# Patient Record
Sex: MALE | Race: NATIVE HAWAIIAN OR OTHER PACIFIC ISLANDER | NOT HISPANIC OR LATINO | Employment: FULL TIME | URBAN - METROPOLITAN AREA
[De-identification: names, ages, dates, MRNs, and addresses within clinical notes are randomized per-mention and may not be internally consistent; named-entity substitution may affect disease eponyms.]

---

## 2021-08-06 ENCOUNTER — OFFICE VISIT (OUTPATIENT)
Dept: URGENT CARE | Facility: CLINIC | Age: 21
End: 2021-08-06
Payer: COMMERCIAL

## 2021-08-06 ENCOUNTER — HOSPITAL ENCOUNTER (OUTPATIENT)
Facility: MEDICAL CENTER | Age: 21
End: 2021-08-06
Attending: NURSE PRACTITIONER
Payer: COMMERCIAL

## 2021-08-06 VITALS
DIASTOLIC BLOOD PRESSURE: 70 MMHG | HEIGHT: 71 IN | TEMPERATURE: 98.7 F | BODY MASS INDEX: 33.4 KG/M2 | OXYGEN SATURATION: 98 % | WEIGHT: 238.54 LBS | RESPIRATION RATE: 16 BRPM | SYSTOLIC BLOOD PRESSURE: 132 MMHG | HEART RATE: 88 BPM

## 2021-08-06 DIAGNOSIS — R05.9 COUGH: ICD-10-CM

## 2021-08-06 DIAGNOSIS — H66.93 ACUTE BACTERIAL OTITIS MEDIA, BILATERAL: ICD-10-CM

## 2021-08-06 DIAGNOSIS — J02.9 PHARYNGITIS, UNSPECIFIED ETIOLOGY: ICD-10-CM

## 2021-08-06 DIAGNOSIS — R42 DIZZINESS: ICD-10-CM

## 2021-08-06 DIAGNOSIS — M79.10 MYALGIA: ICD-10-CM

## 2021-08-06 DIAGNOSIS — U07.1 COVID-19: ICD-10-CM

## 2021-08-06 LAB
INT CON NEG: NORMAL
INT CON POS: NORMAL
S PYO AG THROAT QL: NEGATIVE

## 2021-08-06 PROCEDURE — U0003 INFECTIOUS AGENT DETECTION BY NUCLEIC ACID (DNA OR RNA); SEVERE ACUTE RESPIRATORY SYNDROME CORONAVIRUS 2 (SARS-COV-2) (CORONAVIRUS DISEASE [COVID-19]), AMPLIFIED PROBE TECHNIQUE, MAKING USE OF HIGH THROUGHPUT TECHNOLOGIES AS DESCRIBED BY CMS-2020-01-R: HCPCS

## 2021-08-06 PROCEDURE — 99203 OFFICE O/P NEW LOW 30 MIN: CPT | Performed by: NURSE PRACTITIONER

## 2021-08-06 PROCEDURE — 87880 STREP A ASSAY W/OPTIC: CPT | Performed by: NURSE PRACTITIONER

## 2021-08-06 PROCEDURE — U0005 INFEC AGEN DETEC AMPLI PROBE: HCPCS

## 2021-08-06 RX ORDER — AMOXICILLIN AND CLAVULANATE POTASSIUM 875; 125 MG/1; MG/1
1 TABLET, FILM COATED ORAL 2 TIMES DAILY
Qty: 14 TABLET | Refills: 0 | Status: SHIPPED
Start: 2021-08-06 | End: 2021-08-13

## 2021-08-06 ASSESSMENT — ENCOUNTER SYMPTOMS
HEADACHES: 0
ORTHOPNEA: 0
SINUS PAIN: 0
SPUTUM PRODUCTION: 0
FOCAL WEAKNESS: 0
SPEECH CHANGE: 0
SENSORY CHANGE: 0
DIAPHORESIS: 0
HEMOPTYSIS: 0
WHEEZING: 0
DIZZINESS: 1
SHORTNESS OF BREATH: 0
FEVER: 0
COUGH: 1
MYALGIAS: 1
CHILLS: 0
SORE THROAT: 1
NECK PAIN: 0
PALPITATIONS: 0
TINGLING: 0

## 2021-08-06 NOTE — LETTER
August 6, 2021         Patient: Holomeesi Lilo   YOB: 2000   Date of Visit: 8/6/2021           To Whom it May Concern:    Holomeesi Lilo was seen in my clinic on 8/6/2021. A concern for Covid-19 has been identified and testing is in progress.  We are asking you to excuse absences while following self-isolation protocol per Center for Disease Control (CDC) guidelines.  Your employee will be able to access test results through our electronic delivery system called [employer].    If the results of the testing are negative, and once there has been no fever (temperature >100.4 F) for at least 72 hours without treatment, and no vomiting or diarrhea for at least 48 hours, then return to work is approved.     If the results of testing are positive then your employee will be contacted by the [facility] or [facility] department for further instructions on duration of self-isolation and return to work protocol.  In general, this will also follow the CDC guidelines with a minimum of 10 days from the onset of symptoms and without fever, vomiting, or diarrhea as above.    In general, repeat testing is not necessary and not offered through our [facility].    This is the only note that will be provided from [facility] for this visit.  Your employee will require an appointment with a primary care provider if FMLA or disability forms are required.           Sincerely,           ASA Esparza.  Electronically Signed

## 2021-08-07 DIAGNOSIS — J02.9 PHARYNGITIS, UNSPECIFIED ETIOLOGY: ICD-10-CM

## 2021-08-07 DIAGNOSIS — R05.9 COUGH: ICD-10-CM

## 2021-08-07 DIAGNOSIS — R42 DIZZINESS: ICD-10-CM

## 2021-08-07 DIAGNOSIS — M79.10 MYALGIA: ICD-10-CM

## 2021-08-07 LAB — COVID ORDER STATUS COVID19: NORMAL

## 2021-08-07 NOTE — PROGRESS NOTES
"Subjective:      Holomeesi Lilo is a 20 y.o. male who presents with Pharyngitis (x 1 day , cough , body aches )            Holomeesi comes in today with a 1 day history of intense pharyngitis, mild cough, and myalgias.  He reports he feels dizzy.  No fever, chills, otalgia, nasal congestion, sinus pain, loss of sense of taste or smell, chest pain, shortness of breath, nausea, vomiting, or diarrhea.  He is vaccinated for covid with second dose 2 1/2 weeks ago.      Review of Systems   Constitutional: Positive for malaise/fatigue. Negative for chills, diaphoresis and fever.   HENT: Positive for sore throat. Negative for congestion, ear pain and sinus pain.    Respiratory: Positive for cough. Negative for hemoptysis, sputum production, shortness of breath and wheezing.    Cardiovascular: Negative for chest pain, palpitations, orthopnea and leg swelling.   Musculoskeletal: Positive for myalgias. Negative for neck pain.   Skin: Negative for rash.   Neurological: Positive for dizziness. Negative for tingling, sensory change, speech change, focal weakness and headaches.     Medications, Allergies, and current problem list reviewed today in Epic     Objective:     Blood Pressure 132/70   Pulse 88   Temperature 37.1 °C (98.7 °F) (Temporal)   Respiration 16   Height 1.803 m (5' 11\")   Weight 108 kg (238 lb 8.6 oz)   Oxygen Saturation 98%   Body Mass Index 33.27 kg/m²      Physical Exam  Vitals reviewed.   Constitutional:       General: He is not in acute distress.     Appearance: He is well-developed. He is not ill-appearing, toxic-appearing or diaphoretic.      Comments: He appears fatigued and speaks in a very quite voice due to throat pain.   HENT:      Right Ear: Ear canal and external ear normal. There is no impacted cerumen.      Left Ear: Ear canal and external ear normal. There is no impacted cerumen.      Ears:      Comments: Bilateral bulging TMs with diffuse erythema and purulence.  Landmarks obscured.       " Nose: Nose normal. No congestion or rhinorrhea.      Mouth/Throat:      Mouth: Mucous membranes are moist.      Pharynx: Posterior oropharyngeal erythema present. No oropharyngeal exudate.      Comments: Generalized oropharyngeal erythema with no exudate or edema.  Uvula midline.  Eyes:      General: No scleral icterus.        Right eye: No discharge.         Left eye: No discharge.      Conjunctiva/sclera: Conjunctivae normal.      Pupils: Pupils are equal, round, and reactive to light.   Neck:      Thyroid: No thyromegaly.      Vascular: No JVD.      Trachea: No tracheal deviation.   Cardiovascular:      Rate and Rhythm: Normal rate and regular rhythm.      Heart sounds: Normal heart sounds. No murmur heard.   No friction rub. No gallop.    Pulmonary:      Effort: Pulmonary effort is normal. No respiratory distress.      Breath sounds: Normal breath sounds. No stridor. No wheezing, rhonchi or rales.   Chest:      Chest wall: No tenderness.   Musculoskeletal:      Cervical back: Neck supple. No rigidity or tenderness.   Lymphadenopathy:      Cervical: No cervical adenopathy.   Skin:     General: Skin is warm and dry.      Capillary Refill: Capillary refill takes less than 2 seconds.      Coloration: Skin is not pale.      Findings: No erythema.   Neurological:      General: No focal deficit present.      Mental Status: He is alert and oriented to person, place, and time.      Cranial Nerves: No cranial nerve deficit.      Motor: No weakness.      Coordination: Coordination normal.      Gait: Gait normal.   Psychiatric:         Mood and Affect: Mood normal.               POCT rapid strep a: negative         Assessment/Plan:        1. Pharyngitis, unspecified etiology  POCT Rapid Strep A    SARS-CoV-2, PCR (In-House)   2. Acute bacterial otitis media, bilateral  amoxicillin-clavulanate (AUGMENTIN) 875-125 MG Tab   3. Myalgia  SARS-CoV-2, PCR (In-House)   4. Dizziness  SARS-CoV-2, PCR (In-House)   5. Cough   SARS-CoV-2, PCR (In-House)     Discussed exam findings with Holomeesi.  Likely bacterial OM.  Differential reviewed.  Cannot exclude covid-19.  Will test for covid.   At home isolation recommended pending covid test results.  Take full course of antibiotics as prescribed.  OTC throat sprays or lozenges prn symptom management.  OTC NSAIDs or tylenol prn fever, pain.  Maintain adequate po hydration.  RTC in 5-7 days if symptoms persist, sooner if worse.  ED precautions reviewed.  He verbalized understanding of and agreed with plan of care.    ADDENDUM: NOTIFIED BY PHONE OF POSITIVE COVID TEST RESULTS ON 8/09/21.  1. COVID-19     2. Pharyngitis, unspecified etiology  POCT Rapid Strep A    SARS-CoV-2, PCR (In-House)   3. Acute bacterial otitis media, bilateral  amoxicillin-clavulanate (AUGMENTIN) 875-125 MG Tab   4. Myalgia  SARS-CoV-2, PCR (In-House)   5. Dizziness  SARS-CoV-2, PCR (In-House)   6. Cough  SARS-CoV-2, PCR (In-House)

## 2021-08-08 LAB
SARS-COV-2 RNA RESP QL NAA+PROBE: DETECTED
SPECIMEN SOURCE: ABNORMAL

## 2024-10-09 ENCOUNTER — OFFICE VISIT (OUTPATIENT)
Dept: URGENT CARE | Facility: PHYSICIAN GROUP | Age: 24
End: 2024-10-09
Payer: COMMERCIAL

## 2024-10-09 VITALS
HEART RATE: 81 BPM | BODY MASS INDEX: 36.73 KG/M2 | TEMPERATURE: 97.5 F | SYSTOLIC BLOOD PRESSURE: 120 MMHG | WEIGHT: 271.17 LBS | RESPIRATION RATE: 15 BRPM | HEIGHT: 72 IN | DIASTOLIC BLOOD PRESSURE: 66 MMHG | OXYGEN SATURATION: 97 %

## 2024-10-09 DIAGNOSIS — R09.81 NASAL CONGESTION: ICD-10-CM

## 2024-10-09 DIAGNOSIS — R05.1 ACUTE COUGH: ICD-10-CM

## 2024-10-09 LAB
FLUAV RNA SPEC QL NAA+PROBE: NEGATIVE
FLUBV RNA SPEC QL NAA+PROBE: NEGATIVE
RSV RNA SPEC QL NAA+PROBE: NEGATIVE
SARS-COV-2 RNA RESP QL NAA+PROBE: NEGATIVE

## 2024-10-09 PROCEDURE — 99203 OFFICE O/P NEW LOW 30 MIN: CPT | Performed by: FAMILY MEDICINE

## 2024-10-09 PROCEDURE — 3074F SYST BP LT 130 MM HG: CPT | Performed by: FAMILY MEDICINE

## 2024-10-09 PROCEDURE — 3078F DIAST BP <80 MM HG: CPT | Performed by: FAMILY MEDICINE

## 2024-10-09 PROCEDURE — 0241U POCT CEPHEID COV-2, FLU A/B, RSV - PCR: CPT | Performed by: FAMILY MEDICINE

## 2024-10-09 RX ORDER — DEXTROMETHORPHAN HYDROBROMIDE AND PROMETHAZINE HYDROCHLORIDE 15; 6.25 MG/5ML; MG/5ML
5 SYRUP ORAL 4 TIMES DAILY PRN
Qty: 120 ML | Refills: 0 | Status: SHIPPED | OUTPATIENT
Start: 2024-10-09

## 2024-10-09 ASSESSMENT — ENCOUNTER SYMPTOMS
HEADACHES: 1
EYE REDNESS: 0
VOMITING: 0
EYE DISCHARGE: 0
WEIGHT LOSS: 0
DIARRHEA: 0
NAUSEA: 0

## 2025-04-30 ENCOUNTER — OFFICE VISIT (OUTPATIENT)
Dept: URGENT CARE | Facility: PHYSICIAN GROUP | Age: 25
End: 2025-04-30
Payer: COMMERCIAL

## 2025-04-30 VITALS
OXYGEN SATURATION: 98 % | HEART RATE: 84 BPM | DIASTOLIC BLOOD PRESSURE: 72 MMHG | HEIGHT: 72 IN | BODY MASS INDEX: 36.86 KG/M2 | SYSTOLIC BLOOD PRESSURE: 126 MMHG | WEIGHT: 272.16 LBS | RESPIRATION RATE: 16 BRPM | TEMPERATURE: 98.2 F

## 2025-04-30 DIAGNOSIS — M10.9 ACUTE GOUT INVOLVING TOE OF LEFT FOOT, UNSPECIFIED CAUSE: ICD-10-CM

## 2025-04-30 RX ORDER — INDOMETHACIN 75 MG/1
75 CAPSULE, EXTENDED RELEASE ORAL DAILY
Qty: 30 CAPSULE | Refills: 0 | Status: SHIPPED | OUTPATIENT
Start: 2025-04-30

## 2025-04-30 ASSESSMENT — ENCOUNTER SYMPTOMS
CONSTITUTIONAL NEGATIVE: 1
CARDIOVASCULAR NEGATIVE: 1
GASTROINTESTINAL NEGATIVE: 1
RESPIRATORY NEGATIVE: 1

## 2025-04-30 NOTE — PROGRESS NOTES
Subjective:   Holomeesi Lilo is a 24 y.o. male who presents for Foot Injury (Left toe injury ,red swollen ,not sure what happened x 2 days)      HPI    Review of Systems   Constitutional: Negative.    HENT: Negative.     Respiratory: Negative.     Cardiovascular: Negative.    Gastrointestinal: Negative.    Genitourinary: Negative.    Musculoskeletal:         Left great toe pain, no injury   Skin: Negative.        Medications, Allergies, and current problem list reviewed today in Epic.     Objective:     /72   Pulse 84   Temp 36.8 °C (98.2 °F) (Temporal)   Resp 16   Ht 1.829 m (6')   Wt 123 kg (272 lb 2.5 oz)   SpO2 98%     Physical Exam  Vitals and nursing note reviewed.   Cardiovascular:      Rate and Rhythm: Normal rate and regular rhythm.      Pulses: Normal pulses.      Heart sounds: Normal heart sounds.   Pulmonary:      Effort: Pulmonary effort is normal.      Breath sounds: Normal breath sounds.   Abdominal:      General: Abdomen is flat.      Palpations: Abdomen is soft.   Musculoskeletal:      Comments: Left great toe pain, redness and swelling, using crutches to walk         Assessment/Plan:     Diagnosis and associated orders:     1. Acute gout involving toe of left foot, unspecified cause  indomethacin SR (INDOCIN SR) 75 MG Cap CR         Comments/MDM:     Note for work         Differential diagnosis, natural history, supportive care, and indications for immediate follow-up discussed.    Advised the patient to follow-up with the primary care physician for recheck, reevaluation, and consideration of further management.    Please note that this dictation was created using voice recognition software. I have made a reasonable attempt to correct obvious errors, but I expect that there are errors of grammar and possibly content that I did not discover before finalizing the note.    This note was electronically signed by Felton Stack M.D.

## 2025-04-30 NOTE — LETTER
[facility] [address] [address]     April 30, 2025    Patient: Holomeesi Lilo   YOB: 2000   Date of Visit: 4/30/2025       To Whom It May Concern:    Holomeesi Lilo was seen and treated in our department on 4/30/2025. Please excuse 4/30-5/2.    Sincerely,     Felton Stack M.D.

## 2025-07-08 ENCOUNTER — OFFICE VISIT (OUTPATIENT)
Dept: URGENT CARE | Facility: PHYSICIAN GROUP | Age: 25
End: 2025-07-08
Payer: COMMERCIAL

## 2025-07-08 VITALS
SYSTOLIC BLOOD PRESSURE: 106 MMHG | HEART RATE: 94 BPM | BODY MASS INDEX: 36.64 KG/M2 | HEIGHT: 72 IN | RESPIRATION RATE: 18 BRPM | WEIGHT: 270.5 LBS | OXYGEN SATURATION: 96 % | TEMPERATURE: 97 F | DIASTOLIC BLOOD PRESSURE: 68 MMHG

## 2025-07-08 DIAGNOSIS — M54.41 ACUTE BILATERAL LOW BACK PAIN WITH BILATERAL SCIATICA: Primary | ICD-10-CM

## 2025-07-08 DIAGNOSIS — M54.42 ACUTE BILATERAL LOW BACK PAIN WITH BILATERAL SCIATICA: Primary | ICD-10-CM

## 2025-07-08 PROCEDURE — 99213 OFFICE O/P EST LOW 20 MIN: CPT

## 2025-07-08 RX ORDER — CYCLOBENZAPRINE HCL 5 MG
5-10 TABLET ORAL
Qty: 15 TABLET | Refills: 0 | Status: SHIPPED | OUTPATIENT
Start: 2025-07-08

## 2025-07-08 RX ORDER — IBUPROFEN 600 MG/1
600 TABLET, FILM COATED ORAL EVERY 6 HOURS PRN
Qty: 90 TABLET | Refills: 0 | Status: SHIPPED | OUTPATIENT
Start: 2025-07-08

## 2025-07-08 ASSESSMENT — ENCOUNTER SYMPTOMS
NAUSEA: 0
FEVER: 0
PARESTHESIAS: 0
PERIANAL NUMBNESS: 0
DIZZINESS: 0
BOWEL INCONTINENCE: 0
DIARRHEA: 0
COUGH: 0
ABDOMINAL PAIN: 0
VOMITING: 0
BACK PAIN: 1
TINGLING: 0
NUMBNESS: 0
SHORTNESS OF BREATH: 0
WEAKNESS: 0

## 2025-07-08 NOTE — LETTER
July 8, 2025    To Whom It May Concern:         This is confirmation that Holomeesi Lilo attended his scheduled appointment with CYNDI Green on 7/08/25. Please excuse him from work 7/6-7/9/2025. However, he may return sooner if symptoms improve.          Sincerely,          ASA Green.  [phone]

## 2025-07-08 NOTE — PROGRESS NOTES
"Subjective     Holomeesi Lilo is a 24 y.o. male who presents with Back Pain (Was cleaning the garage lifting heavy things, after lower back pain started, X 3 days.)            Started Sunday after rearranging garage lifting, heavy equipment  Stretches, massage      Back Pain  This is a new problem. The current episode started in the past 7 days. The problem is unchanged. The pain is present in the lumbar spine. The quality of the pain is described as aching. The pain is moderate. The symptoms are aggravated by position. Pertinent negatives include no abdominal pain, bladder incontinence, bowel incontinence, chest pain, fever, numbness, paresthesias, pelvic pain, perianal numbness, tingling or weakness.       Review of Systems   Constitutional:  Negative for fever and malaise/fatigue.   HENT:  Negative for congestion.    Respiratory:  Negative for cough and shortness of breath.    Cardiovascular:  Negative for chest pain.   Gastrointestinal:  Negative for abdominal pain, bowel incontinence, diarrhea, nausea and vomiting.   Genitourinary:  Negative for bladder incontinence and pelvic pain.   Musculoskeletal:  Positive for back pain.   Skin:  Negative for rash.   Neurological:  Negative for dizziness, tingling, weakness, numbness and paresthesias.   All other systems reviewed and are negative.             Objective     /68 (BP Location: Left arm, Patient Position: Sitting, BP Cuff Size: Large adult)   Pulse 94   Temp 36.1 °C (97 °F)   Resp 18   Ht 1.816 m (5' 11.5\")   Wt 123 kg (270 lb 8 oz)   SpO2 96%   BMI 37.20 kg/m²      Physical Exam  Vitals reviewed.   Constitutional:       Appearance: Normal appearance. He is not ill-appearing.   HENT:      Head: Normocephalic and atraumatic.      Nose: Nose normal.   Eyes:      Conjunctiva/sclera: Conjunctivae normal.   Cardiovascular:      Rate and Rhythm: Normal rate.   Pulmonary:      Effort: Pulmonary effort is normal.   Musculoskeletal:      Lumbar back: " Left message for patient to call about results.     Spasms and tenderness present. No edema or signs of trauma. Decreased range of motion. Positive right straight leg raise test and positive left straight leg raise test.   Skin:     General: Skin is warm and dry.   Neurological:      Mental Status: He is alert and oriented to person, place, and time.   Psychiatric:         Behavior: Behavior normal.         Judgment: Judgment normal.                                  Assessment & Plan  Acute bilateral low back pain with bilateral sciatica    Orders:    ibuprofen (MOTRIN) 600 MG Tab; Take 1 Tablet by mouth every 6 hours as needed for Inflammation or Moderate Pain.    cyclobenzaprine (FLEXERIL) 5 MG tablet; Take 1-2 Tablets by mouth at bedtime as needed for Muscle Spasms.    Referral to establish with PCP    Referral to Physical Therapy       Encouraged PRICE therapy as well as alternating ibuprofen and acetaminophen as needed for swelling and pain.    Given educational information with low back stretches/exercises.    Work note provided.    Shared decision-making was utilized with Holomeesi for plan of care. Discussed differential diagnoses, natural history, and supportive care. Reviewed indication for use and the potential benefits and side effects of medications. Holomeesi was encouraged to monitor symptoms closely and educated about signs and symptoms that would warrant concern and mandate seeking a higher level of service through the emergency department discussed at length. All questions answered to Holomeesi's satisfaction and they were in agreement with treatment plan at time of discharge.

## 2025-07-10 ENCOUNTER — APPOINTMENT (OUTPATIENT)
Dept: URGENT CARE | Facility: PHYSICIAN GROUP | Age: 25
End: 2025-07-10
Payer: COMMERCIAL

## 2025-07-14 NOTE — Clinical Note
REFERRAL APPROVAL NOTICE         Sent on July 14, 2025                   Holomeesi Lilo  [address] [place] [address]                   Dear Mr. Nichols,    After a careful review of the medical information and benefit coverage, [facility] has processed your referral. See below for additional details.    If applicable, you must be actively enrolled with your insurance for coverage of the authorized service. If you have any questions regarding your coverage, please contact your insurance directly.    REFERRAL INFORMATION   Referral #:  [id number]  Referred-To Department    Referred-By Provider:  Physical Therapy    CYNDI Green   [facility] [address]      [address]  [address]  [place] [address] [phone] [address]  [address]  [place] [id number] [phone]    Referral Start Date:  07/08/2025  Referral End Date:   07/08/2026             SCHEDULING  If you do not already have an appointment, please call [phone] to make an appointment.     MORE INFORMATION  If you do not already have a [employer] account, sign up at: [email or web address]  You can access your medical information, make appointments, see lab results, billing information, and more.  If you have questions regarding this referral, please contact  the [facility] Referrals department at:             [phone]. Monday - Friday 8:00AM - 5:00PM.     Sincerely,    [facility]

## 2025-07-18 ENCOUNTER — HOSPITAL ENCOUNTER (OUTPATIENT)
Dept: RADIOLOGY | Facility: MEDICAL CENTER | Age: 25
End: 2025-07-18
Attending: FAMILY MEDICINE
Payer: COMMERCIAL

## 2025-07-18 DIAGNOSIS — M54.16 LUMBAR RADICULAR PAIN: ICD-10-CM

## 2025-07-18 PROCEDURE — 72100 X-RAY EXAM L-S SPINE 2/3 VWS: CPT
